# Patient Record
Sex: MALE | Race: WHITE | NOT HISPANIC OR LATINO
[De-identification: names, ages, dates, MRNs, and addresses within clinical notes are randomized per-mention and may not be internally consistent; named-entity substitution may affect disease eponyms.]

---

## 2022-06-09 PROBLEM — Z00.00 ENCOUNTER FOR PREVENTIVE HEALTH EXAMINATION: Status: ACTIVE | Noted: 2022-06-09

## 2022-06-10 ENCOUNTER — NON-APPOINTMENT (OUTPATIENT)
Age: 22
End: 2022-06-10

## 2022-06-10 ENCOUNTER — APPOINTMENT (OUTPATIENT)
Dept: PULMONOLOGY | Facility: CLINIC | Age: 22
End: 2022-06-10
Payer: COMMERCIAL

## 2022-06-10 VITALS
OXYGEN SATURATION: 98 % | BODY MASS INDEX: 22.4 KG/M2 | HEART RATE: 85 BPM | WEIGHT: 160 LBS | HEIGHT: 71 IN | DIASTOLIC BLOOD PRESSURE: 70 MMHG | SYSTOLIC BLOOD PRESSURE: 130 MMHG

## 2022-06-10 DIAGNOSIS — R05.3 CHRONIC COUGH: ICD-10-CM

## 2022-06-10 PROCEDURE — 99204 OFFICE O/P NEW MOD 45 MIN: CPT | Mod: 25

## 2022-06-10 PROCEDURE — 94010 BREATHING CAPACITY TEST: CPT

## 2022-06-10 NOTE — HISTORY OF PRESENT ILLNESS
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 He developed a cough about 2-3 months ago. It is mostly dry but occasionally productive of slight yellow sputum. There was no obvious preceding URI. He also has nasal congestion. No history of asthma or allergies. He's been treated by pediatrics initially with Augmentin had been recently treated with a Z-Esequiel. There's been no wheezing or shortness of breath. He now states he is coughing only about 5-6 times per day. He is concerned because of a persistent cough. He has nasal congestion as well as cough. His roommate in college also had a cough. He denies heartburn or postnasal drip. He took Flonase for only one week without improvement.

## 2022-06-10 NOTE — ASSESSMENT
[FreeTextEntry1] : Patient will likely post viral URI cough. I suspect the cough will resolve over the following   one month. Spirometry is normal. I doubt we are dealing with asthma. I will patient to call me in one month

## 2022-06-10 NOTE — PHYSICAL EXAM
[No Acute Distress] : no acute distress [Well Nourished] : well nourished [Well Developed] : well developed [Well-Appearing] : well-appearing [Normal Sclera/Conjunctiva] : normal sclera/conjunctiva [PERRL] : pupils equal round and reactive to light [EOMI] : extraocular movements intact [Normal Outer Ear/Nose] : the outer ears and nose were normal in appearance [Normal Oropharynx] : the oropharynx was normal [No JVD] : no jugular venous distention [No Lymphadenopathy] : no lymphadenopathy [Supple] : supple [Thyroid Normal, No Nodules] : the thyroid was normal and there were no nodules present [No Respiratory Distress] : no respiratory distress  [No Accessory Muscle Use] : no accessory muscle use [Clear to Auscultation] : lungs were clear to auscultation bilaterally [Normal Rate] : normal rate  [Regular Rhythm] : with a regular rhythm [Normal S1, S2] : normal S1 and S2 [No Murmur] : no murmur heard [No Carotid Bruits] : no carotid bruits [No Abdominal Bruit] : a ~M bruit was not heard ~T in the abdomen [No Varicosities] : no varicosities [Pedal Pulses Present] : the pedal pulses are present [No Edema] : there was no peripheral edema [No Palpable Aorta] : no palpable aorta [No Extremity Clubbing/Cyanosis] : no extremity clubbing/cyanosis [Soft] : abdomen soft [Non Tender] : non-tender [Non-distended] : non-distended [No Masses] : no abdominal mass palpated [No HSM] : no HSM [Normal Bowel Sounds] : normal bowel sounds [Normal Posterior Cervical Nodes] : no posterior cervical lymphadenopathy [Normal Anterior Cervical Nodes] : no anterior cervical lymphadenopathy [No Focal Deficits] : no focal deficits [Normal Gait] : normal gait [Normal Affect] : the affect was normal [Normal Insight/Judgement] : insight and judgment were intact